# Patient Record
Sex: FEMALE | Employment: OTHER
[De-identification: names, ages, dates, MRNs, and addresses within clinical notes are randomized per-mention and may not be internally consistent; named-entity substitution may affect disease eponyms.]

---

## 2017-06-03 ENCOUNTER — HEALTH MAINTENANCE LETTER (OUTPATIENT)
Age: 57
End: 2017-06-03

## 2017-09-04 ENCOUNTER — OFFICE VISIT (OUTPATIENT)
Dept: URGENT CARE | Facility: URGENT CARE | Age: 57
End: 2017-09-04
Payer: COMMERCIAL

## 2017-09-04 VITALS
DIASTOLIC BLOOD PRESSURE: 72 MMHG | SYSTOLIC BLOOD PRESSURE: 113 MMHG | TEMPERATURE: 98.4 F | HEART RATE: 84 BPM | WEIGHT: 127.8 LBS | OXYGEN SATURATION: 100 % | BODY MASS INDEX: 21.27 KG/M2

## 2017-09-04 DIAGNOSIS — S90.212A SUBUNGUAL HEMATOMA OF GREAT TOE OF LEFT FOOT, INITIAL ENCOUNTER: Primary | ICD-10-CM

## 2017-09-04 DIAGNOSIS — S80.212A ABRASION, LEFT KNEE, INITIAL ENCOUNTER: ICD-10-CM

## 2017-09-04 PROCEDURE — 99202 OFFICE O/P NEW SF 15 MIN: CPT | Performed by: PHYSICIAN ASSISTANT

## 2017-09-04 NOTE — MR AVS SNAPSHOT
"              After Visit Summary   2017    Brandi Serrato    MRN: [id number]           Patient Information     Date Of Birth          1960        Visit Information        Provider Department      2017 12:00 PM Hope Haynes PA-C [facility]        Today's Diagnoses     Subungual hematoma of great toe of left foot, initial encounter    -  1    Abrasion, left knee, initial encounter           Follow-ups after your visit        Who to contact     If you have questions or need follow up information about today's clinic visit or your schedule please contact [facility] directly at [phone].  Normal or non-critical lab and imaging results will be communicated to you by MyChart, letter or phone within 4 business days after the clinic has received the results. If you do not hear from us within 7 days, please contact the clinic through MyChart or phone. If you have a critical or abnormal lab result, we will notify you by phone as soon as possible.  Submit refill requests through [employer] or call your pharmacy and they will forward the refill request to us. Please allow 3 business days for your refill to be completed.          Additional Information About Your Visit        MyChart Information     [employer]te lets you send messages to your doctor, view your test results, renew your prescriptions, schedule appointments and more. To sign up, go to [email or web address] . Click on \"Log in\" on the left side of the screen, which will take you to the Welcome page. Then click on \"Sign up Now\" on the right side of the page.     You will be asked to enter the access code listed below, as well as some personal information. Please follow the directions to create your username and password.     Your access code is: [email or web address]  Expires: 12/3/2017 11:13 PM     Your access code will  in 90 days. If you need help or a new code, please call your [facility] or " [phone].        Care EveryWhere ID     This is your Care EveryWhere ID. This could be used by other organizations to access your [place] medical records  [id number]        Your Vitals Were     Pulse Temperature Pulse Oximetry BMI (Body Mass Index)          84 98.4  F (36.9  C) (Oral) 100% 21.27 kg/m2         Blood Pressure from Last 3 Encounters:   09/04/17 113/72   05/06/15 108/74   03/26/15 111/76    Weight from Last 3 Encounters:   09/04/17 127 lb 12.8 oz (58 kg)   05/06/15 130 lb (59 kg)   03/26/15 130 lb (59 kg)              Today, you had the following     No orders found for display       Primary Care Provider Office Phone # Fax #    Bean Machuca [phone] [address] [facility] [address]        Equal Access to Services     [facility] : Hadii aad ku hadasho Soomaali, waaxda luqadaha, qaybta kaalmada adeegyada, waxay emirin haygenian xu sloan . So [facility] [phone].    ATENCIÓN: Si habla español, tiene a tatum disposición servicios gratuitos de asistencia lingüística. Llame al [phone].    We comply with applicable federal civil rights laws and [place] laws. We do not discriminate on the basis of race, color, national origin, age, disability sex, sexual orientation or gender identity.            Thank you!     Thank you for choosing [facility]  for your care. Our goal is always to provide you with excellent care. Hearing back from our patients is one way we can continue to improve our services. Please take a few minutes to complete the written survey that you may receive in the mail after your visit with us. Thank you!             Your Updated Medication List - Protect others around you: Learn how to safely use, store and throw away your medicines at www.disposemymeds.org.          This list is accurate as of: 9/4/17 11:13 PM.  Always use your most recent med list.                   Brand Name Dispense Instructions for use  Diagnosis    fluticasone 50 MCG/ACT spray    FLONASE    1 Package    Spray 2 sprays into both nostrils daily    Sinusitis, chronic

## 2017-09-04 NOTE — PROGRESS NOTES
SUBJECTIVE:   Brandi Serrato is a 57 year old female who presents to clinic today for the following health issues:      Fall Injury      Duration: 1 day    Description (location/character/radiation): left foot big toe, left knee    Intensity:  moderate    Accompanying signs and symptoms: pain, redness, bruised    History (similar episodes/previous evaluation): None    Precipitating or alleviating factors: None    Therapies tried and outcome: ibuprofen     This happened yesterday around 2pm  She got up from the couch, and her left great toenail got stuck in her pants  She then fell on the left knee.   The toe is the main concerns  She iced the knee right away and used a bandaid for the abrosiaon  She took ibuprofen yesterday all day  The toenail hurts, she wrapped it up yesterday and has not looked since.  End of toe is not numb  Bad sharp shooting pain last night.   It is slightly better today.     Problem list and histories reviewed & adjusted, as indicated.  Additional history: as documented    Patient Active Problem List   Diagnosis     CARDIOVASCULAR SCREENING; LDL GOAL LESS THAN 160     No past surgical history on file.    Social History   Substance Use Topics     Smoking status: Never Smoker     Smokeless tobacco: Never Used     Alcohol use No     No family history on file.      Current Outpatient Prescriptions   Medication Sig Dispense Refill     fluticasone (FLONASE) 50 MCG/ACT nasal spray Spray 2 sprays into both nostrils daily 1 Package 11     No Known Allergies      Reviewed and updated as needed this visit by clinical staff     Reviewed and updated as needed this visit by Provider       ROS:  As in HPI    OBJECTIVE:     /72 (BP Location: Left arm, Patient Position: Chair, Cuff Size: Adult Regular)  Pulse 84  Temp 98.4  F (36.9  C) (Oral)  Wt 127 lb 12.8 oz (58 kg)  SpO2 100%  BMI 21.27 kg/m2  Body mass index is 21.27 kg/(m^2).  GENERAL: healthy, alert and no distress  MS: Left knee: abrasion,  no swelling, no tenderness along joint line, normal motion.  Left great toe: Subungual hematoma without active bleeding, mild tenderness.  No erythema, swelling, or purulence.   SKIN: no suspicious lesions or rashes  NEURO: Normal strength and tone, mentation intact and speech normal    Diagnostic Test Results:  none     ASSESSMENT/PLAN:     1. Subungual hematoma of great toe of left foot, initial encounter  Keep toenail bandaged  Watch for signs of infection.   Ok to use bacitracin     2. Abrasion, left knee, initial encounter  Bandage applied, encouraged ROM      Hope Haynes PA-C  [facility]

## 2017-09-04 NOTE — NURSING NOTE
"Chief Complaint   Patient presents with     Fall     Patient had fall yesterday while getting up from couch.       Initial /72 (BP Location: Left arm, Patient Position: Chair, Cuff Size: Adult Regular)  Pulse 84  Temp 98.4  F (36.9  C) (Oral)  Wt 127 lb 12.8 oz (58 kg)  SpO2 100%  BMI 21.27 kg/m2 Estimated body mass index is 21.27 kg/(m^2) as calculated from the following:    Height as of 5/6/15: 5' 5\" (1.651 m).    Weight as of this encounter: 127 lb 12.8 oz (58 kg).  Medication Reconciliation: complete       Faye Sorto    "

## 2021-06-03 ENCOUNTER — TRANSCRIBE ORDERS (OUTPATIENT)
Dept: OTHER | Age: 61
End: 2021-06-03

## 2021-06-03 DIAGNOSIS — C92.10 CML (CHRONIC MYELOCYTIC LEUKEMIA) (H): Primary | ICD-10-CM

## 2021-06-04 ENCOUNTER — PATIENT OUTREACH (OUTPATIENT)
Dept: ONCOLOGY | Facility: CLINIC | Age: 61
End: 2021-06-04

## 2021-06-04 NOTE — PROGRESS NOTES
New Patient Hematology/Oncology Nurse Navigator Note     Referral Date: Melva 3, 2021     Referring provider: per intake team note:      Call received from Orlando (son) requesting a 2nd opinion for dx: CML - patient is currently inpatient at [facility] records available in Epic/CE - Please call Orlando to schedule [phone]          Referral updates and Plan:   June 4, 2021   OUTGOING CALL to pt:  Introduced my role as nurse navigator with [facility] Hematology/Oncology dept and that we have recd the referral from pt's son, who confirms that pt is now at home after recent ED visit.  Pt's son confirms his mother is aware of the referral and ready to schedule  -Transferred pt to NPS line [phone] to schedule appt per scheduling instructions: SCHEDULE next available inperson consult at [facility]   Pt's son voiced understanding of above instructions and information and denied further questions, has our callback number below as needed.    Ruth Garcia, RN, BSN, OCN  Hematology/Oncology Nurse Navigator   [facility]  [phone]

## 2021-06-07 NOTE — TELEPHONE ENCOUNTER
RECORDS STATUS - ALL OTHER DIAGNOSIS      RECORDS RECEIVED FROM:    DATE RECEIVED:    NOTES STATUS DETAILS   OFFICE NOTE from referring provider [facility]    OFFICE NOTE from medical oncologist  -  Dr. Andreia Butler: 5/20/21   DISCHARGE SUMMARY from hospital     DISCHARGE REPORT from the ER [facility] 5/28/21   OPERATIVE REPORT [facility] 4/6/21: BMB   MEDICATION LIST [facility]   CLINICAL TRIAL TREATMENTS TO DATE     LABS     PATHOLOGY REPORTS [facility], Report in CE, Slides received 6/14 4/6/21: [id number]   ANYTHING RELATED TO DIAGNOSIS Epic/CE 6/1/21   GENONOMIC TESTING     TYPE:     IMAGING (NEED IMAGES & REPORT)     CT SCANS     MRI PACS 4/3/21:    MAMMO     ULTRASOUND     PET

## 2021-06-15 PROCEDURE — 999N001031 HC STATISTIC REV BONE MARROW OUTSIDE SLIDES TC 88321: Performed by: INTERNAL MEDICINE

## 2021-06-15 PROCEDURE — 88321 CONSLTJ&REPRT SLD PREP ELSWR: CPT | Performed by: PATHOLOGY

## 2021-06-17 LAB — COPATH REPORT: NORMAL

## 2021-07-02 ENCOUNTER — PRE VISIT (OUTPATIENT)
Dept: TRANSPLANT | Facility: CLINIC | Age: 61
End: 2021-07-02

## 2021-08-20 ENCOUNTER — OFFICE VISIT (OUTPATIENT)
Dept: TRANSPLANT | Facility: CLINIC | Age: 61
End: 2021-08-20
Attending: INTERNAL MEDICINE
Payer: COMMERCIAL

## 2021-08-20 ENCOUNTER — ANCILLARY PROCEDURE (OUTPATIENT)
Dept: GENERAL RADIOLOGY | Facility: CLINIC | Age: 61
End: 2021-08-20
Attending: INTERNAL MEDICINE
Payer: COMMERCIAL

## 2021-08-20 VITALS
TEMPERATURE: 98.5 F | RESPIRATION RATE: 14 BRPM | BODY MASS INDEX: 23.18 KG/M2 | HEART RATE: 92 BPM | DIASTOLIC BLOOD PRESSURE: 72 MMHG | OXYGEN SATURATION: 100 % | HEIGHT: 64 IN | WEIGHT: 135.8 LBS | SYSTOLIC BLOOD PRESSURE: 110 MMHG

## 2021-08-20 DIAGNOSIS — R06.02 SHORTNESS OF BREATH: Primary | ICD-10-CM

## 2021-08-20 DIAGNOSIS — R06.02 SHORTNESS OF BREATH: ICD-10-CM

## 2021-08-20 PROCEDURE — G0463 HOSPITAL OUTPT CLINIC VISIT: HCPCS

## 2021-08-20 PROCEDURE — 71046 X-RAY EXAM CHEST 2 VIEWS: CPT | Performed by: RADIOLOGY

## 2021-08-20 PROCEDURE — 99205 OFFICE O/P NEW HI 60 MIN: CPT | Performed by: INTERNAL MEDICINE

## 2021-08-20 RX ORDER — LIDOCAINE 50 MG/G
PATCH TOPICAL
COMMUNITY
Start: 2021-08-17

## 2021-08-20 RX ORDER — MEGESTROL ACETATE 40 MG/ML
SUSPENSION ORAL
COMMUNITY
Start: 2021-08-04

## 2021-08-20 RX ORDER — PROCHLORPERAZINE MALEATE 10 MG
10 TABLET ORAL
COMMUNITY
Start: 2021-05-28

## 2021-08-20 RX ORDER — FUROSEMIDE 20 MG
20 TABLET ORAL DAILY
Qty: 14 TABLET | Refills: 0 | Status: SHIPPED | OUTPATIENT
Start: 2021-08-20

## 2021-08-20 RX ORDER — ONDANSETRON 8 MG/1
8 TABLET, FILM COATED ORAL
COMMUNITY
Start: 2021-04-16

## 2021-08-20 RX ORDER — PREGABALIN 150 MG/1
CAPSULE ORAL
COMMUNITY
Start: 2021-08-17

## 2021-08-20 RX ORDER — FAMOTIDINE 20 MG/1
20 TABLET, FILM COATED ORAL
COMMUNITY
Start: 2021-04-15

## 2021-08-20 ASSESSMENT — MIFFLIN-ST. JEOR: SCORE: 1175.98

## 2021-08-20 NOTE — PROGRESS NOTES
[facility] Consultation       Brandi Serrato is a 61 year old female referred by Dr. Gomez for CML, chronic phase.       Hematologic history:  Diagnosed with CML in March 2021 after noted to have persistent leukocytosis on labs. Blood PCR 3/26/21 showed major breakpoint (p210) at 51.54%. 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 Low risk disease based on no splenomegaly, normal plt count, and no peripheral blasts.       FINAL DIAGNOSIS   Bone marrow aspirate, clot section, and trephine core biopsy:    Chronic myeloid leukemia, BCR-ABL1 positive    Hypercellular bone marrow (70%) with myeloid hyperplasia    Blasts less than 1%    Focal mild reticulin fibrosis        She was started on Dasatinib at that time at 100 mg daily. Since starting treatment, she has had persistent symptoms including headaches and dizziness,   10 lbs weight loss, low appetite, nausea, fatigue, bloating. She reports SOB with exertion, can only walk 1/2 mile and also has a dry cough. Gets full easily. She was started on Amitryptiline for low mood which she did not tolerate (now stopped). Using Compazine and Zofran for nausea as needed. ALso started on Megace for appetite- some improvement in appetite. Dasatinib was reduced to 80 mg in June 2021, with some improvement in fatigue. Then reduced to 70 mg daily last week.       PMH:  1. Chronic Low back pain      Social:  [place]. Lives with her  and son.   2014 early disability due to low back pain.   Enjoys gardening.      Family Hx:  Non contributory         Date Treatment Response Toxicities/Complications   3/2021 Dasatinib 100 mg, reduced to 80 mg in June 2021, then 70 mg in Aug 2021                               HPI:  Please see my entry above for hematologic history.        ASSESSMENT AND PLAN:  60 yo F with chronic phase CML on Dasatinib, with major molecular response (last BCR ABL major PCR 0.026%) but frequent and  persistent side effects. Side effects include nausea, fatigue, headaches, low appetite, weight loss. Some improvement with dose reduction- last dose reduction to 70 mg was only 1 week ago. Her physical activity is limited by dyspnea with exertion. We did obtain a CXR today for further workup and she has a moderate L pleural effusion, likely Dasatinib-related. Pleural effusions are common with dasatinib and can often be managed without discontinuation, but she already has a poor quality of life on current reduced dose of dasatinib, so it may not be sustainable to continue it long term.     We discussed that switching TKIs is an option based on her constitutional symptoms and pleural effusion.  Otherwise, her symptoms could be monitored for improvement on the lower 70 mg Dasatinib dose. I would favor trying Nilotinib as not as many GI side effects as Imatinib, and incidence of pleural effusions much lower than Dasatinib. Would need monitoring for QT and LFTs. This is a twice daily drug. We discussed other TKIs bosutinib and ponatinib, which I would reserve for later lines of therapy if needed due to more serious side effects.  She is planning to go to [place] later this month and does not want to start any new treatments before then, so it would be reasonable to take a break from TKIs for a few weeks and then resume when she gets back.     For her pleural effusion, I prescribed her a short course of Lasix 20 mg once daily. She will be seen by Dr. Yi next week, who can consider setting her up for repeat CXR and thoracentesis if no significant improvement in her symptoms with diuresis. I briefly described what this procedure would look like. Echocardiogram is also indicated to evaluate for other etiologies of pleural effusion.     I will let Dr. Yi know about this PLEF so that he can contact the patient on whether to stop Dasatinib as this time.     All questions answered to patient and her son's apparent  "satisfaction.       Chantal Florez MD   of Medicine  Hematology, Oncology and Transplantation   Pager: [phone]          ROS:    10 point ROS neg other than the symptoms noted above in the HPI.          Social History     Tobacco Use     Smoking status: Never Smoker     Smokeless tobacco: Never Used   Substance Use Topics     Alcohol use: No     Drug use: No          No Known Allergies     Current Outpatient Medications   Medication Sig Dispense Refill     cholecalciferol 50 MCG (2000 UT) tablet Take 2,000 Units by mouth       dasatinib (SPRYCEL) 70 MG tablet Take 70 mg by mouth       famotidine (PEPCID) 20 MG tablet Take 20 mg by mouth       lidocaine (LIDODERM) 5 % patch APPLY 1 PATCH TOPICALLY TO  SKIN DAILY       ondansetron (ZOFRAN) 8 MG tablet Take 8 mg by mouth       pregabalin (LYRICA) 150 MG capsule TAKE 1 CAPSULE BY MOUTH  TWICE DAILY       prochlorperazine (COMPAZINE) 10 MG tablet Take 10 mg by mouth       tiZANidine (ZANAFLEX) 4 MG tablet TAKE 1 TABLET BY MOUTH 3  TIMES DAILY AS NEEDED       fluticasone (FLONASE) 50 MCG/ACT nasal spray Spray 2 sprays into both nostrils daily 1 Package 11     megestrol (MEGACE) 40 MG/ML suspension GIVE 5 ML BY MOUTH DAILY           Physical Exam:     Vital Signs: /72   Pulse 92   Temp 98.5  F (36.9  C) (Oral)   Resp 14   Ht 1.626 m (5' 4\")   Wt 61.6 kg (135 lb 12.8 oz)   SpO2 100%   BMI 23.31 kg/m          KPS:  90    General Appearance: alert and no distress  Eyes: PERRL, conjunctiva and lids normal, sclera nonicteric  Ears/Nose/M/Throat: Oral mucosa and posterior oropharynx normal, moist mucous membranes  Neck supple, non-tender, free range of motion, no adenopathy  Cardio/Vascular:regular rate and rhythm, normal S1 and S2, no murmur  Resp Effort And Auscultation: No wheezing. Decreased BS 1/3 up left lung.   GI: soft, nontender, bowel sounds present in all four quadrants. Spleen tip is minimally palpable.   Lymphatics:no significant " enlargement of lymph nodes globally   Musculoskeletal: Musculoskeletal normal  Edema: none  Skin: Skin color, texture, turgor normal. No rashes or lesions.  Neurologic: Gait normal.  Sensation grossly WNL.  Psych/Affect: Mood and affect are appropriate.        Brandi understood the above assessment and recommendations.  Multiple questions answered.  No barriers to learning identified.       Total time: 60 minutes  Counseling time: 50 minutes  Prolonged service:  no    Chantal Florez MD    ------------------------------------------------------------------------------------------------------------------------------------------------    Patient Care Team       Relationship Specialty Notifications Start End    Bean Machuca MD PCP - General   [birth year]     Phone: [phone] Fax: [phone]         [address] [place] [address]    Chantal Florez MD MD Hematology & Oncology All results, Admissions 6/4/21     Phone: [phone] Fax: [phone]         [address] [facility] [address] [facility] [place] [address]    Andreia Butler MD MD Medical Oncology All results, Admissions 6/4/21     Phone: [phone] Fax: [phone]         [facility] [address] [place] [address]

## 2021-08-20 NOTE — LETTER
8/20/2021         RE: Brandi Serrato  [address]  [place] [address]        Dear Colleague,    Thank you for referring your patient, Brandi Serrato, to the [facility] [place]. Please see a copy of my visit note below.    [facility] Consultation       Brandi Serrato is a 61 year old female referred by Dr. Gomez for CML, chronic phase.       Hematologic history:  Diagnosed with CML in March 2021 after noted to have persistent leukocytosis on labs. Blood PCR 3/26/21 showed major breakpoint (p210) at 51.54%. 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 Low risk disease based on no splenomegaly, normal plt count, and no peripheral blasts.       FINAL DIAGNOSIS   Bone marrow aspirate, clot section, and trephine core biopsy:    Chronic myeloid leukemia, BCR-ABL1 positive    Hypercellular bone marrow (70%) with myeloid hyperplasia    Blasts less than 1%    Focal mild reticulin fibrosis        She was started on Dasatinib at that time at 100 mg daily. Since starting treatment, she has had persistent symptoms including headaches and dizziness,   10 lbs weight loss, low appetite, nausea, fatigue, bloating. She reports SOB with exertion, can only walk 1/2 mile and also has a dry cough. Gets full easily. She was started on Amitryptiline for low mood which she did not tolerate (now stopped). Using Compazine and Zofran for nausea as needed. ALso started on Megace for appetite- some improvement in appetite. Dasatinib was reduced to 80 mg in June 2021, with some improvement in fatigue. Then reduced to 70 mg daily last week.       PMH:  1. Chronic Low back pain      Social:  [place]. Lives with her  and son.   2014 early disability due to low back pain.   Enjoys gardening.      Family Hx:  Non contributory         Date Treatment Response Toxicities/Complications   3/2021 Dasatinib 100 mg, reduced to 80  mg in June 2021, then 70 mg in Aug 2021                               HPI:  Please see my entry above for hematologic history.        ASSESSMENT AND PLAN:  62 yo F with chronic phase CML on Dasatinib, with major molecular response (last BCR ABL major PCR 0.026%) but frequent and persistent side effects. Side effects include nausea, fatigue, headaches, low appetite, weight loss. Some improvement with dose reduction- last dose reduction to 70 mg was only 1 week ago. Her physical activity is limited by dyspnea with exertion. We did obtain a CXR today for further workup and she has a moderate L pleural effusion, likely Dasatinib-related. Pleural effusions are common with dasatinib and can often be managed without discontinuation, but she already has a poor quality of life on current reduced dose of dasatinib, so it may not be sustainable to continue it long term.     We discussed that switching TKIs is an option based on her constitutional symptoms and pleural effusion.  Otherwise, her symptoms could be monitored for improvement on the lower 70 mg Dasatinib dose. I would favor trying Nilotinib as not as many GI side effects as Imatinib, and incidence of pleural effusions much lower than Dasatinib. Would need monitoring for QT and LFTs. This is a twice daily drug. We discussed other TKIs bosutinib and ponatinib, which I would reserve for later lines of therapy if needed due to more serious side effects.  She is planning to go to [place] later this month and does not want to start any new treatments before then, so it would be reasonable to take a break from TKIs for a few weeks and then resume when she gets back.     For her pleural effusion, I prescribed her a short course of Lasix 20 mg once daily. She will be seen by Dr. Yi next week, who can consider setting her up for repeat CXR and thoracentesis if no significant improvement in her symptoms with diuresis. I briefly described what this procedure would look  "like. Echocardiogram is also indicated to evaluate for other etiologies of pleural effusion.     I will let Dr. Yi know about this PLEF so that he can contact the patient on whether to stop Dasatinib as this time.     All questions answered to patient and her son's apparent satisfaction.       Chantal Florez MD   of Medicine  Hematology, Oncology and Transplantation   Pager: [phone]          ROS:    10 point ROS neg other than the symptoms noted above in the HPI.          Social History     Tobacco Use     Smoking status: Never Smoker     Smokeless tobacco: Never Used   Substance Use Topics     Alcohol use: No     Drug use: No          No Known Allergies     Current Outpatient Medications   Medication Sig Dispense Refill     cholecalciferol 50 MCG (2000 UT) tablet Take 2,000 Units by mouth       dasatinib (SPRYCEL) 70 MG tablet Take 70 mg by mouth       famotidine (PEPCID) 20 MG tablet Take 20 mg by mouth       lidocaine (LIDODERM) 5 % patch APPLY 1 PATCH TOPICALLY TO  SKIN DAILY       ondansetron (ZOFRAN) 8 MG tablet Take 8 mg by mouth       pregabalin (LYRICA) 150 MG capsule TAKE 1 CAPSULE BY MOUTH  TWICE DAILY       prochlorperazine (COMPAZINE) 10 MG tablet Take 10 mg by mouth       tiZANidine (ZANAFLEX) 4 MG tablet TAKE 1 TABLET BY MOUTH 3  TIMES DAILY AS NEEDED       fluticasone (FLONASE) 50 MCG/ACT nasal spray Spray 2 sprays into both nostrils daily 1 Package 11     megestrol (MEGACE) 40 MG/ML suspension GIVE 5 ML BY MOUTH DAILY           Physical Exam:     Vital Signs: /72   Pulse 92   Temp 98.5  F (36.9  C) (Oral)   Resp 14   Ht 1.626 m (5' 4\")   Wt 61.6 kg (135 lb 12.8 oz)   SpO2 100%   BMI 23.31 kg/m          KPS:  90    General Appearance: alert and no distress  Eyes: PERRL, conjunctiva and lids normal, sclera nonicteric  Ears/Nose/M/Throat: Oral mucosa and posterior oropharynx normal, moist mucous membranes  Neck supple, non-tender, free range of motion, no " adenopathy  Cardio/Vascular:regular rate and rhythm, normal S1 and S2, no murmur  Resp Effort And Auscultation: No wheezing. Decreased BS 1/3 up left lung.   GI: soft, nontender, bowel sounds present in all four quadrants. Spleen tip is minimally palpable.   Lymphatics:no significant enlargement of lymph nodes globally   Musculoskeletal: Musculoskeletal normal  Edema: none  Skin: Skin color, texture, turgor normal. No rashes or lesions.  Neurologic: Gait normal.  Sensation grossly WNL.  Psych/Affect: Mood and affect are appropriate.        Brandi understood the above assessment and recommendations.  Multiple questions answered.  No barriers to learning identified.       Total time: 60 minutes  Counseling time: 50 minutes  Prolonged service:  no    Chantal Florez MD    ------------------------------------------------------------------------------------------------------------------------------------------------    Patient Care Team       Relationship Specialty Notifications Start End    Bean Machuca MD PCP - General   [birth year]     Phone: [phone] Fax: [phone] [address] [facility] [address]    Chantal Florez MD MD Hematology & Oncology All results, Admissions 6/4/21     Phone: [phone] Fax: [phone]         [address] [facility] [address] [facility] [address]    Andreia Butler MD MD Medical Oncology All results, Admissions 6/4/21     Phone: [phone] Fax: [phone]         [facility]CER CARE [address] [place] [address]              Again, thank you for allowing me to participate in the care of your patient.        Sincerely,        Chantal Florez MD

## 2021-08-20 NOTE — NURSING NOTE
"Oncology Rooming Note    August 20, 2021 4:50 PM   Brandi Serrato is a 61 year old female who presents for:    Chief Complaint   Patient presents with     RECHECK     Pt is here for a New Eval for CML     Initial Vitals: Blood Pressure 110/72   Pulse 92   Temperature 98.5  F (36.9  C) (Oral)   Respiration 14   Height 1.626 m (5' 4\")   Weight 61.6 kg (135 lb 12.8 oz)   Oxygen Saturation 100%   Body Mass Index 23.31 kg/m   Estimated body mass index is 23.31 kg/m  as calculated from the following:    Height as of this encounter: 1.626 m (5' 4\").    Weight as of this encounter: 61.6 kg (135 lb 12.8 oz). Body surface area is 1.67 meters squared.  Data Unavailable Comment: Data Unavailable   No LMP recorded. Patient has had an injection.  Allergies reviewed: Yes  Medications reviewed: Yes    Medications: Medication refills not needed today.  Pharmacy name entered into [employer]: [id number] - [place] - [address]    Clinical concerns: none       Mabel Avila MA            "